# Patient Record
Sex: MALE | Race: OTHER | URBAN - METROPOLITAN AREA
[De-identification: names, ages, dates, MRNs, and addresses within clinical notes are randomized per-mention and may not be internally consistent; named-entity substitution may affect disease eponyms.]

---

## 2019-05-16 ENCOUNTER — APPOINTMENT (OUTPATIENT)
Dept: GENERAL RADIOLOGY | Age: 24
End: 2019-05-16

## 2019-05-16 ENCOUNTER — HOSPITAL ENCOUNTER (EMERGENCY)
Age: 24
Discharge: HOME | End: 2019-05-17
Attending: EMERGENCY MEDICINE

## 2019-05-16 ENCOUNTER — APPOINTMENT (OUTPATIENT)
Dept: CT IMAGING | Age: 24
End: 2019-05-16

## 2019-05-16 DIAGNOSIS — R55 SYNCOPE, UNSPECIFIED SYNCOPE TYPE: Primary | ICD-10-CM

## 2019-05-16 DIAGNOSIS — S01.511A LIP LACERATION, INITIAL ENCOUNTER: ICD-10-CM

## 2019-05-16 LAB
A/G RATIO: 1.5 (ref 1.1–2.2)
ALBUMIN SERPL-MCNC: 4.8 G/DL (ref 3.4–5)
ALP BLD-CCNC: 50 U/L (ref 40–129)
ALT SERPL-CCNC: 15 U/L (ref 10–40)
ANION GAP SERPL CALCULATED.3IONS-SCNC: 13 MMOL/L (ref 3–16)
AST SERPL-CCNC: 22 U/L (ref 15–37)
BASOPHILS ABSOLUTE: 0 K/UL (ref 0–0.2)
BASOPHILS RELATIVE PERCENT: 0.3 %
BILIRUB SERPL-MCNC: 0.4 MG/DL (ref 0–1)
BUN BLDV-MCNC: 10 MG/DL (ref 7–20)
CALCIUM SERPL-MCNC: 9.9 MG/DL (ref 8.3–10.6)
CHLORIDE BLD-SCNC: 101 MMOL/L (ref 99–110)
CO2: 25 MMOL/L (ref 21–32)
CREAT SERPL-MCNC: 1 MG/DL (ref 0.9–1.3)
EOSINOPHILS ABSOLUTE: 0 K/UL (ref 0–0.6)
EOSINOPHILS RELATIVE PERCENT: 0.3 %
GFR AFRICAN AMERICAN: >60
GFR NON-AFRICAN AMERICAN: >60
GLOBULIN: 3.3 G/DL
GLUCOSE BLD-MCNC: 83 MG/DL (ref 70–99)
HCT VFR BLD CALC: 41 % (ref 40.5–52.5)
HEMOGLOBIN: 13.9 G/DL (ref 13.5–17.5)
LYMPHOCYTES ABSOLUTE: 1.4 K/UL (ref 1–5.1)
LYMPHOCYTES RELATIVE PERCENT: 19.9 %
MCH RBC QN AUTO: 31.4 PG (ref 26–34)
MCHC RBC AUTO-ENTMCNC: 33.8 G/DL (ref 31–36)
MCV RBC AUTO: 92.7 FL (ref 80–100)
MONOCYTES ABSOLUTE: 0.4 K/UL (ref 0–1.3)
MONOCYTES RELATIVE PERCENT: 6.2 %
NEUTROPHILS ABSOLUTE: 5.1 K/UL (ref 1.7–7.7)
NEUTROPHILS RELATIVE PERCENT: 73.3 %
PDW BLD-RTO: 14.4 % (ref 12.4–15.4)
PLATELET # BLD: 277 K/UL (ref 135–450)
PMV BLD AUTO: 8.2 FL (ref 5–10.5)
POTASSIUM REFLEX MAGNESIUM: 3.9 MMOL/L (ref 3.5–5.1)
RBC # BLD: 4.42 M/UL (ref 4.2–5.9)
SODIUM BLD-SCNC: 139 MMOL/L (ref 136–145)
TOTAL PROTEIN: 8.1 G/DL (ref 6.4–8.2)
WBC # BLD: 6.9 K/UL (ref 4–11)

## 2019-05-16 PROCEDURE — 70450 CT HEAD/BRAIN W/O DYE: CPT

## 2019-05-16 PROCEDURE — 36415 COLL VENOUS BLD VENIPUNCTURE: CPT

## 2019-05-16 PROCEDURE — 85025 COMPLETE CBC W/AUTO DIFF WBC: CPT

## 2019-05-16 PROCEDURE — 99285 EMERGENCY DEPT VISIT HI MDM: CPT

## 2019-05-16 PROCEDURE — 4500000025 HC ED LEVEL 5 PROCEDURE

## 2019-05-16 PROCEDURE — 93005 ELECTROCARDIOGRAM TRACING: CPT | Performed by: PHYSICIAN ASSISTANT

## 2019-05-16 PROCEDURE — 71046 X-RAY EXAM CHEST 2 VIEWS: CPT

## 2019-05-16 PROCEDURE — 80053 COMPREHEN METABOLIC PANEL: CPT

## 2019-05-16 RX ORDER — PENICILLIN V POTASSIUM 500 MG/1
500 TABLET ORAL 4 TIMES DAILY
Qty: 20 TABLET | Refills: 0 | Status: SHIPPED | OUTPATIENT
Start: 2019-05-16 | End: 2019-05-21

## 2019-05-16 ASSESSMENT — PAIN - FUNCTIONAL ASSESSMENT: PAIN_FUNCTIONAL_ASSESSMENT: 0-10

## 2019-05-16 ASSESSMENT — PAIN SCALES - GENERAL
PAINLEVEL_OUTOF10: 7
PAINLEVEL_OUTOF10: 6
PAINLEVEL_OUTOF10: 8
PAINLEVEL_OUTOF10: 7

## 2019-05-16 ASSESSMENT — PAIN DESCRIPTION - ORIENTATION
ORIENTATION: LEFT
ORIENTATION: LEFT
ORIENTATION: RIGHT

## 2019-05-16 ASSESSMENT — PAIN DESCRIPTION - LOCATION
LOCATION: KNEE
LOCATION: KNEE
LOCATION: FOOT

## 2019-05-16 ASSESSMENT — PAIN DESCRIPTION - PAIN TYPE
TYPE: ACUTE PAIN

## 2019-05-16 ASSESSMENT — PAIN DESCRIPTION - FREQUENCY: FREQUENCY: INTERMITTENT

## 2019-05-16 ASSESSMENT — ENCOUNTER SYMPTOMS
RHINORRHEA: 0
VOMITING: 0
CHEST TIGHTNESS: 0
FACIAL SWELLING: 1
COLOR CHANGE: 0
SHORTNESS OF BREATH: 0
ABDOMINAL PAIN: 0
NAUSEA: 0

## 2019-05-16 ASSESSMENT — PAIN DESCRIPTION - DESCRIPTORS
DESCRIPTORS: ACHING
DESCRIPTORS: ACHING
DESCRIPTORS: ACHING;BURNING
DESCRIPTORS: BURNING

## 2019-05-16 ASSESSMENT — ACTIVITIES OF DAILY LIVING (ADL): EFFECT OF PAIN ON DAILY ACTIVITIES: HURTS TO WALK

## 2019-05-17 VITALS
OXYGEN SATURATION: 98 % | TEMPERATURE: 98.1 F | SYSTOLIC BLOOD PRESSURE: 108 MMHG | HEART RATE: 68 BPM | WEIGHT: 152.12 LBS | DIASTOLIC BLOOD PRESSURE: 56 MMHG

## 2019-05-17 LAB
EKG ATRIAL RATE: 73 BPM
EKG DIAGNOSIS: NORMAL
EKG P AXIS: 88 DEGREES
EKG P-R INTERVAL: 164 MS
EKG Q-T INTERVAL: 392 MS
EKG QRS DURATION: 92 MS
EKG QTC CALCULATION (BAZETT): 431 MS
EKG R AXIS: 71 DEGREES
EKG T AXIS: 72 DEGREES
EKG VENTRICULAR RATE: 73 BPM

## 2019-05-17 PROCEDURE — 90471 IMMUNIZATION ADMIN: CPT | Performed by: PHYSICIAN ASSISTANT

## 2019-05-17 PROCEDURE — 6360000002 HC RX W HCPCS: Performed by: PHYSICIAN ASSISTANT

## 2019-05-17 PROCEDURE — 90715 TDAP VACCINE 7 YRS/> IM: CPT | Performed by: PHYSICIAN ASSISTANT

## 2019-05-17 PROCEDURE — 93010 ELECTROCARDIOGRAM REPORT: CPT | Performed by: INTERNAL MEDICINE

## 2019-05-17 RX ADMIN — TETANUS TOXOID, REDUCED DIPHTHERIA TOXOID AND ACELLULAR PERTUSSIS VACCINE, ADSORBED 0.5 ML: 5; 2.5; 8; 8; 2.5 SUSPENSION INTRAMUSCULAR at 00:35

## 2019-05-17 NOTE — ED NOTES
Ambulates to room #7 accompanied by his mother  History is obtained by the pt who states [place], and felt light [place]-states he has had this sensation in the past and knew that he should sit down before he \"passed out\"-states he got to the car and thought that he should tell his friend so he started back, which is when he passed out-hitting his lower left side of lip and abrasions to both knees.   Awake alert and oriented X 3 NITHYA  States he has passed out in the past and had work up including a and echo cardiogram.  Skin w/d color good resp are easy complains of being cold, warmed blankets applied  Mid level at bedside       Terrie Guevara RN  05/16/19 2398

## 2019-05-17 NOTE — ED PROVIDER NOTES
[address] [facility] ENCOUNTER        Pt Name: Shandra Dennis  MRN: [id number]  [place] 1995  Date of evaluation: 5/16/2019  Provider: Tia Everett PA-C  PCP: No primary care provider on file. This patient was seen and evaluated by the attending physician Dr. Gisela Martinez       Chief Complaint   Patient presents with    Lip Laceration     states that he passed out and \"busted his lip\"  states has passed out before and recognized the signs       HISTORY OF PRESENT ILLNESS   (Location/Symptom, Timing/Onset, Context/Setting, Quality, Duration, Modifying Factors, Severity)  Note limiting factors. Shandra Dennis is a 22 y.o. male presents to emergency Department by private vehicle following the syncopal episode. Patient was at the [employer] store when he began to feel lightheaded. States he's had syncopal episodes previously, last episode about 2 years ago. Has been worked up previously for syncope with no definitive diagnosis. Once he began feeling light headed he went to go sit in his car. He sat down once he arrived to his vehicle, instead of remaining seated he stood up and stepped out of his vehicle. At that time he lost consciousness. He is unsure how long she was unconscious for. He did sustain abrasions to his knees bilaterally, left cheek and lower lip. Has pain rated 6/10. He denies any visual disturbance, nausea, vomiting. Denies any chest pain, shortness of breath, extremity injury. Apart from feeling lightheaded at time of episode, no other complaints. No other medical problems. Nursing Notes were all reviewed and agreed with or any disagreements were addressed  in the HPI. REVIEW OF SYSTEMS    (2-9 systems for level 4, 10 or more for level 5)     Review of Systems   Constitutional: Negative for chills and fever. HENT: Positive for facial swelling. Negative for congestion and rhinorrhea.     Respiratory: Negative for chest tightness and shortness of breath. Cardiovascular: Negative. Gastrointestinal: Negative for abdominal pain, nausea and vomiting. Genitourinary: Negative. Musculoskeletal: Negative for arthralgias and myalgias. Skin: Positive for wound. Negative for color change and pallor. Neurological: Positive for syncope. Negative for dizziness and light-headedness. Psychiatric/Behavioral: Negative for behavioral problems and confusion. Positives and Pertinent negatives as per HPI. Except as noted abovein the ROS, all other systems were reviewed and negative. PAST MEDICAL HISTORY   History reviewed. No pertinent past medical history. SURGICAL HISTORY   History reviewed. No pertinent surgical history. [address]       Discharge Medication List as of 5/17/2019 12:12 AM            ALLERGIES     Patient has no known allergies. FAMILYHISTORY     History reviewed. No pertinent family history.        SOCIAL HISTORY       Social History     Socioeconomic History    Marital status: Single     Spouse name: None    Number of children: None    Years of education: None    Highest education level: None   Occupational History    None   Social Needs    Financial resource strain: None    Food insecurity:     Worry: None     Inability: None    Transportation needs:     Medical: None     Non-medical: None   Tobacco Use    Smoking status: Never Smoker    Smokeless tobacco: Never Used   Substance and Sexual Activity    Alcohol use: Yes     Comment: rarely    Drug use: Never    Sexual activity: Not Currently   Lifestyle    Physical activity:     Days per week: None     Minutes per session: None    Stress: None   Relationships    Social connections:     Talks on phone: None     Gets together: None     Attends Gnosticism service: None     Active member of club or organization: None     Attends meetings of clubs or organizations: None     Relationship status: None    Intimate partner violence:     Fear of current or ex partner: None     Emotionally abused: None     Physically abused: None     Forced sexual activity: None   Other Topics Concern    None   Social History Narrative    None       SCREENINGS             PHYSICAL EXAM    (up to 7 for level 4, 8 or more for level 5)     ED Triage Vitals   BP Temp Temp src Pulse Resp SpO2 Height Weight   -- -- -- -- -- -- -- --       Physical Exam   Constitutional: He is oriented to person, place, and time. He appears well-developed and well-nourished. HENT:   Head: Head is without raccoon's eyes and without Sun's sign. Right Ear: No hemotympanum. Left Ear: No hemotympanum. Through and through lip laceration 1.5 cm externally, 1 cm internally   Eyes: Conjunctivae and EOM are normal.   Neck: Normal range of motion. Neck supple. Cardiovascular: Normal rate and regular rhythm. No murmur heard. Pulmonary/Chest: Effort normal and breath sounds normal. No stridor. No respiratory distress. He has no wheezes. Neurological: He is alert and oriented to person, place, and time. No cranial nerve deficit. Coordination (normal finger to nose, negative protonator drift, no ataxia) normal.   Skin: Skin is warm. He is not diaphoretic. No erythema. For facial abrasions to knees bilaterally, not grossly contaminated, bleeding controlled upon arrival   Psychiatric: He has a normal mood and affect. His behavior is normal.   Vitals reviewed.       DIAGNOSTIC RESULTS   LABS:    Labs Reviewed   CBC WITH AUTO DIFFERENTIAL    Narrative:     Performed at:  [facility]) [facility]  [address] [place], [facility]   Phone [phone]   COMPREHENSIVE METABOLIC PANEL W/ REFLEX TO MG FOR LOW K    Narrative:     Performed at:  [facility]  [address] [place], [facility]   Phone [phone]       All other labs were within normal range or not returned as of this dictation. EKG: All EKG's are interpreted by the Emergency Department Physician who either signs orCo-signs this chart in the absence of a cardiologist.  Please see their note for interpretation of EKG. RADIOLOGY:   Non-plain film images such as CT, Ultrasound and MRI are read by the radiologist. Plain radiographic images are visualized andpreliminarily interpreted by the  ED Provider with the below findings:        Interpretation perthe Radiologist below, if available at the time of this note:    CT HEAD WO CONTRAST   Final Result   No acute intracranial abnormality. XR CHEST STANDARD (2 VW)   Final Result   These. No results found. PROCEDURES   Unless otherwise noted below, none     Lac Repair  Date/Time: 5/16/2019 10:15 PM  Performed by: Santi Monsivais PA-C  Authorized by: Santi Monsivais PA-C     Consent:     Consent obtained:  Verbal    Consent given by:  Patient    Risks discussed:  Infection, pain, poor cosmetic result and poor wound healing    Alternatives discussed:  No treatment  Anesthesia (see MAR for exact dosages): Anesthesia method:  Local infiltration    Local anesthetic:  Lidocaine 1% w/o epi  Laceration details:     Location:  Lip    Lip location:  Lower lip, full thickness    Vermilion border involved: yes      Height of lip laceration:  Vermillion only    Length (cm):  2  Repair type:     Repair type:   Intermediate  Exploration:     Hemostasis achieved with:  Direct pressure  Treatment:     Area cleansed with:  Cecil    Amount of cleaning:  Standard    Irrigation solution:  Sterile saline    Irrigation method:  Pressure wash  Mucous membrane repair:     Suture size:  5-0    Suture material:  Fast-absorbing gut    Suture technique:  Simple interrupted    Number of sutures:  2  Skin repair:     Repair method:  Sutures    Suture size:  5-0    Suture material:  Prolene    Suture technique:  Simple interrupted    Number of sutures:  3  Approximation: Approximation:  Close    Vermilion border: well-aligned    Post-procedure details:     Dressing:  Open (no dressing)    Patient tolerance of procedure: Tolerated well, no immediate complications        CRITICAL CARE TIME   N/A    CONSULTS:  None      EMERGENCY DEPARTMENT COURSE and DIFFERENTIALDIAGNOSIS/MDM:   Vitals:    Vitals:    05/16/19 2140 05/16/19 2240   BP: 110/78 (!) 108/56   Pulse: 73 68   Temp: 98.1 °F (36.7 °C)    TempSrc: Oral    SpO2: 98% 98%   Weight: 152 lb 1.9 oz (69 kg)        Patient was given thefollowing medications:  Medications   Tetanus-Diphth-Acell Pertussis (BOOSTRIX) injection 0.5 mL (0.5 mLs Intramuscular Given 5/17/19 0035)       Patient presents to ED with HPI noted above. He is hemodynamically stable, afebrile and nontoxic-appearing. He is not hypoxic with oxygen saturation of 98% on room air. Given syncopal episode basic labs obtained as well as EKG and chest x-ray. Patient did have a head injury, no neurological exam.  Patient unsure how long he was unconscious, a CT of head without contrast obtained. Pending imaging and lab results my shift ended. Please see Dr. Nida Carpenter note regarding final disposition. Prior to my departure laceration of lower lip was repaired as noted above by myself. Patient will need antibiotics. Tetanus updated in the ED. The patient tolerated their visit well. They were seen and evaluated by the attending physician, Dr. Jerod Perkins who agreed with the assessment and plan. The patient and / or the family were informed of the results of any tests, a time was given to answer questions, a plan was proposed and they agreed with plan. FINAL IMPRESSION      1. Syncope, unspecified syncope type    2.  Lip laceration, initial encounter          DISPOSITION/PLAN   DISPOSITION        PATIENT REFERREDTO:  [facility]) Referral  Call [phone] for an appointment  Call today        DISCHARGE MEDICATIONS:  Discharge Medication List as of 5/17/2019 12:12

## 2019-05-17 NOTE — ED PROVIDER NOTES
Attending Note:    The patient was seen and examined by the mid-level provider. I also completed a face-to-face examination. HPI:  The patient presented with a lip laceration that occurred when he passed out prior to arrival. He reports that he was at a store, talking with a , and suddenly felt lightheaded feeling like he was going to pass out. He described this as a familiar feeling given the fact that it has happened 2 or 3 times in the past. His last episode of syncope was in 2017 and he underwent workup at that time including evaluation by cardiologist and 2-D echocardiogram which she reports were normal.    Today he reports he started to feel lightheaded, became clammy, and then his vision became fuzzy. He tried to walk to his car but passed out and fell striking his head on the ground. He had a brief loss of consciousness. He denies any seizure activity. He did not bite his tongue. No incontinence. No history of seizures. He denies any recent illness, fever, chills, nausea vomiting or diarrhea. He denies any associated chest pain heaviness pressure or tightness. He denies any shortness of breath. He denies any recent travel history. No leg or calf pain. He denies any history of thromboembolic disease. He denies any prior family or personal history of arrhythmia. No family history of syncope. Physical Exam: Awake and alert and oriented ×3. GCS 15. Speech. Clear. Gait steady. Cranial nerves II through XII are intact. No facial droop. No acute focal motor or sensory deficits. Heart regular rate and rhythm without murmur. Lungs clear to auscultation. Abdomen soft and nontender. He does have laceration to the lower lip that extends onto the interim mucosa and an abrasion to the chin. Wound has been repaired by the physician assistant prior to my examination. Neck is nontender. No point or axial tenderness. Chest wall nontender. Extremities nontender with full range of motion.  No calf tenderness erythema or edema. EKG: Normal sinus rhythm. Rate 73. NM interval 164 ms. QRS duration 92 ms.  ms. R axis 71°. No acute ST elevation. He did have diffuse J-point elevation with notching consistent with early repolarization. Medical Decision-Making:  The patient presented with an episode of syncope as noted above. He is currently asymptomatic. He denies any current chest pain or shortness of breath. I suspect that his syncope was vasovagal in origin. I recommended that he follow-up with his primary care physician. Given the fact that he has had 2 or 3 episodes I recommended further evaluation with 24 hour Holter monitor was advised to contact his primary care physician to schedule this to be done. I advised to not drive or operate machinery feeling dizzy or lightheaded. If his condition worsens or new symptoms develop, he was advised to return immediately to the emergency department. He will be given a prescription for Pen-Vee K for his oral laceration. I recommended suture removal in 5-7 days with his primary care physician. I am in agreement with the physician assistants treatment plan and disposition.      Shoaib Orourke,   05/17/19 7364

## 2019-05-17 NOTE — ED NOTES
Abrasions cleansed and PSO and DSD applied-tolerated well  Mother remains at bedside.   Instructions reviewed with pt and mother     Charly Abdi RN  05/17/19 001

## 2022-12-22 ENCOUNTER — OFFICE VISIT (OUTPATIENT)
Dept: URGENT CARE | Age: 27
End: 2022-12-22

## 2022-12-22 VITALS
RESPIRATION RATE: 16 BRPM | DIASTOLIC BLOOD PRESSURE: 70 MMHG | TEMPERATURE: 98.7 F | WEIGHT: 161 LBS | OXYGEN SATURATION: 95 % | HEIGHT: 76 IN | SYSTOLIC BLOOD PRESSURE: 104 MMHG | BODY MASS INDEX: 19.61 KG/M2 | HEART RATE: 90 BPM

## 2022-12-22 DIAGNOSIS — Z20.2 SEXUALLY TRANSMITTED DISEASE EXPOSURE: Primary | ICD-10-CM

## 2022-12-22 ASSESSMENT — ENCOUNTER SYMPTOMS
SHORTNESS OF BREATH: 0
COUGH: 0
SORE THROAT: 0

## 2022-12-22 NOTE — LETTER
[facility]  [address] [address]  Phone: [phone]  Fax: [phone]    ADAN Crandall CNP        December 22, 2022     Patient: Nancy Warner   YOB: 1995   Date of Visit: 12/22/2022       To Whom it May Concern:    Nancy Warner was seen in my clinic on 12/22/2022. He may return to work on 12/22/2022. If you have any questions or concerns, please don't hesitate to call.     Sincerely,         ADAN Crandall - JOSE   By: Dusty Alicea CMA

## 2022-12-22 NOTE — PROGRESS NOTES
Pia Howard (:  1995) is a 33 y.o. male,New patient, here for evaluation of the following chief complaint(s):  Sexually Transmitted Diseases (Would like screened for all STIs, concerned about HPV and Chlamydia, was positive last month and did treatment, needs a test of cure ) and Mass (Penile area )      ASSESSMENT/PLAN:    ICD-10-CM    1. Sexually transmitted disease exposure  Z20.2 [facility] - Fernando Payne MD, Urology, Evelyn Mccord     C.trachomatis N.gonorrhoeae DNA        Urology referral for possible HPV. Urine sent for chlamydia for test of cure. Safe sex recommended. Follow up with Urgent Care if testing is abnormal.    SUBJECTIVE/OBJECTIVE:    History provided by:  Patient   used: No    Sexually Transmitted Diseases  The patient's primary symptoms include genital lesions (white bump to penis). The patient's pertinent negatives include no genital injury, genital itching, pelvic pain, penile discharge, penile pain, scrotal swelling or testicular pain. This is a new problem. The current episode started 1 to 4 weeks ago. The problem has been gradually worsening. Pertinent negatives include no chills, coughing, joint pain, painful intercourse, rash, shortness of breath or sore throat. He has tried nothing for the symptoms. He is sexually active with multiple partners. He consistently uses condoms. No, his partner does not have an STD. His past medical history is significant for chlamydia. Mass  Associated symptoms: no cough, no rash, no shortness of breath and no sore throat    HPI:   33 y.o. male presents with symptom of \"bump on penis\" ongoing for one month (see above). Denies pain, drainage, or discharge. Has not applied anything topically for symptoms. Per Care Everywhere, he was seen at [address] [place] [address] in 2022. Tested positive for chlamydia and completed a course of doxycycline. Tested negative for gonorrhea, syphilis, Hep A, B, & C, HSV, and HIV.     Vitals:    22 1318 BP: 104/70   Site: Left Upper Arm   Position: Sitting   Cuff Size: Medium Adult   Pulse: 90   Resp: 16   Temp: 98.7 °F (37.1 °C)   SpO2: 95%   Weight: 161 lb (73 kg)   Height: 6' 4\" (1.93 m)       Review of Systems   Constitutional:  Negative for chills and diaphoresis. HENT:  Negative for sore throat. Respiratory:  Negative for cough and shortness of breath. Genitourinary:  Negative for pelvic pain, penile discharge, penile pain, scrotal swelling and testicular pain. Musculoskeletal:  Negative for joint pain. Skin:  Negative for rash. Physical Exam  Exam conducted with a chaperone present (jose kay). Constitutional:       Appearance: Normal appearance. HENT:      Head: Normocephalic and atraumatic. Mouth/Throat:      Mouth: Mucous membranes are moist.      Pharynx: Oropharynx is clear. Cardiovascular:      Rate and Rhythm: Normal rate and regular rhythm. Pulses: Normal pulses. Heart sounds: Normal heart sounds. Pulmonary:      Effort: Pulmonary effort is normal.      Breath sounds: Normal breath sounds. Genitourinary:     Penis: Lesions (small dark bump to base of left side of penis; no other lesions or open areas) present. No erythema, tenderness, discharge or swelling. Testes: Normal.       Musculoskeletal:      Cervical back: Normal range of motion and neck supple. Neurological:      Mental Status: He is alert. An electronic signature was used to authenticate this note.     --ADAN Pacheco - CNP

## 2022-12-22 NOTE — PATIENT INSTRUCTIONS
Urology referral  Urine sent for chlamydia for recheck after treatment.   Follow up with Urgent Care if testing is abnormal.

## 2022-12-23 LAB
C. TRACHOMATIS DNA ,URINE: NEGATIVE
N. GONORRHOEAE DNA, URINE: NEGATIVE

## 2023-10-01 ENCOUNTER — OFFICE VISIT (OUTPATIENT)
Dept: URGENT CARE | Age: 28
End: 2023-10-01

## 2023-10-01 VITALS
DIASTOLIC BLOOD PRESSURE: 67 MMHG | TEMPERATURE: 98.2 F | SYSTOLIC BLOOD PRESSURE: 93 MMHG | HEART RATE: 96 BPM | HEIGHT: 77 IN | OXYGEN SATURATION: 96 % | BODY MASS INDEX: 19.48 KG/M2 | WEIGHT: 165 LBS

## 2023-10-01 DIAGNOSIS — R30.0 DYSURIA: ICD-10-CM

## 2023-10-01 DIAGNOSIS — Z11.3 SCREENING FOR STD (SEXUALLY TRANSMITTED DISEASE): Primary | ICD-10-CM

## 2023-10-01 PROBLEM — A74.9 CHLAMYDIA: Status: ACTIVE | Noted: 2022-11-11

## 2023-10-01 LAB
BILIRUBIN, POC: ABNORMAL
BLOOD URINE, POC: ABNORMAL
CLARITY, POC: CLEAR
COLOR, POC: ABNORMAL
GLUCOSE URINE, POC: ABNORMAL
KETONES, POC: ABNORMAL
LEUKOCYTE EST, POC: ABNORMAL
NITRITE, POC: ABNORMAL
PH, POC: 5.5
PROTEIN, POC: ABNORMAL
SPECIFIC GRAVITY, POC: 1.02
SPECIMEN TYPE: NORMAL
TRICHOMONAS VAGINALIS SCREEN: NEGATIVE
UROBILINOGEN, POC: 0.2

## 2023-10-02 LAB
BACTERIA UR CULT: NORMAL
HIV 1+2 AB+HIV1 P24 AG SERPL QL IA: NORMAL
HIV 2 AB SERPL QL IA: NORMAL
HIV1 AB SERPL QL IA: NORMAL
HIV1 P24 AG SERPL QL IA: NORMAL
REAGIN+T PALLIDUM IGG+IGM SERPL-IMP: NORMAL

## 2023-10-03 ENCOUNTER — TELEPHONE (OUTPATIENT)
Dept: URGENT CARE | Age: 28
End: 2023-10-03

## 2023-10-03 LAB
C TRACH DNA UR QL NAA+PROBE: NEGATIVE
N GONORRHOEA DNA UR QL NAA+PROBE: NEGATIVE

## 2023-10-03 NOTE — TELEPHONE ENCOUNTER
Attempted to call patient left vm to call back. Please let him know his Gonorrhea, Chlamydia, trichomas, HIV, syphilis and urine culture were all negative. Still waiting on HSV to result.  If still having sxs he can follow up at urgent care or PCP

## 2023-10-06 ENCOUNTER — TELEPHONE (OUTPATIENT)
Dept: URGENT CARE | Age: 28
End: 2023-10-06

## 2023-10-06 LAB
HSV1 GG IGG SER-ACNC: 0.45 IV
HSV1+2 IGG SER IA-ACNC: 2.81 IV
HSV1+2 IGM SER IA-ACNC: 0.36 IV
HSV2 GG IGG SER-ACNC: 0.56 IV

## 2023-10-06 NOTE — TELEPHONE ENCOUNTER
Patient called and notified of negative STD testing. HSV combined was positive for herpes and additional testing is recommended to confirm. Patient verbalized understanding. Advised to follow up with PCP or UC.

## 2023-10-18 DIAGNOSIS — Z11.3 SCREENING FOR STD (SEXUALLY TRANSMITTED DISEASE): Primary | ICD-10-CM

## 2023-11-28 DIAGNOSIS — Z11.3 SCREENING FOR STD (SEXUALLY TRANSMITTED DISEASE): ICD-10-CM

## 2023-12-01 LAB
HSV1 GG IGG SER-ACNC: 0.23 IV
HSV1+2 IGG SER IA-ACNC: 2.61 IV
HSV1+2 IGM SER IA-ACNC: 0.42 IV
HSV2 GG IGG SER-ACNC: 0.09 IV

## 2023-12-06 NOTE — RESULT ENCOUNTER NOTE
Called and discussed HSV results.   The combined was positive but individually HSV1 and HSV2 were negative.     I discussed follow up with a PCP or establishing one local to follow up and to do further investigation.     Patient agreed and will follow up.

## 2025-07-08 ENCOUNTER — OFFICE VISIT (OUTPATIENT)
Dept: URGENT CARE | Age: 30
End: 2025-07-08

## 2025-07-08 ENCOUNTER — APPOINTMENT (OUTPATIENT)
Age: 30
End: 2025-07-08
Payer: COMMERCIAL

## 2025-07-08 ENCOUNTER — HOSPITAL ENCOUNTER (EMERGENCY)
Age: 30
Discharge: HOME | End: 2025-07-08
Attending: EMERGENCY MEDICINE
Payer: COMMERCIAL

## 2025-07-08 VITALS
RESPIRATION RATE: 18 BRPM | OXYGEN SATURATION: 93 % | SYSTOLIC BLOOD PRESSURE: 117 MMHG | BODY MASS INDEX: 19.48 KG/M2 | WEIGHT: 165 LBS | DIASTOLIC BLOOD PRESSURE: 75 MMHG | HEART RATE: 63 BPM | TEMPERATURE: 97.7 F | HEIGHT: 77 IN

## 2025-07-08 VITALS
TEMPERATURE: 98.6 F | DIASTOLIC BLOOD PRESSURE: 62 MMHG | HEART RATE: 79 BPM | SYSTOLIC BLOOD PRESSURE: 104 MMHG | OXYGEN SATURATION: 98 %

## 2025-07-08 DIAGNOSIS — S09.90XA CLOSED HEAD INJURY, INITIAL ENCOUNTER: Primary | ICD-10-CM

## 2025-07-08 DIAGNOSIS — S09.90XA INJURY OF HEAD, INITIAL ENCOUNTER: Primary | ICD-10-CM

## 2025-07-08 PROCEDURE — 70450 CT HEAD/BRAIN W/O DYE: CPT

## 2025-07-08 PROCEDURE — 99284 EMERGENCY DEPT VISIT MOD MDM: CPT

## 2025-07-08 PROCEDURE — 6370000000 HC RX 637 (ALT 250 FOR IP): Performed by: EMERGENCY MEDICINE

## 2025-07-08 RX ORDER — ONDANSETRON 4 MG/1
4 TABLET, ORALLY DISINTEGRATING ORAL ONCE
Status: COMPLETED | OUTPATIENT
Start: 2025-07-08 | End: 2025-07-08

## 2025-07-08 RX ORDER — METOCLOPRAMIDE HYDROCHLORIDE 5 MG/ML
10 INJECTION INTRAMUSCULAR; INTRAVENOUS ONCE
Status: DISCONTINUED | OUTPATIENT
Start: 2025-07-08 | End: 2025-07-08

## 2025-07-08 RX ORDER — NAPROXEN 500 MG/1
500 TABLET ORAL 2 TIMES DAILY WITH MEALS
Qty: 14 TABLET | Refills: 0 | Status: SHIPPED | OUTPATIENT
Start: 2025-07-08 | End: 2025-07-15

## 2025-07-08 RX ORDER — ACETAMINOPHEN 500 MG
1000 TABLET ORAL
Status: COMPLETED | OUTPATIENT
Start: 2025-07-08 | End: 2025-07-08

## 2025-07-08 RX ORDER — METOCLOPRAMIDE 10 MG/1
10 TABLET ORAL
Qty: 120 TABLET | Refills: 3 | Status: SHIPPED | OUTPATIENT
Start: 2025-07-08

## 2025-07-08 RX ORDER — METOCLOPRAMIDE 5 MG/1
10 TABLET ORAL ONCE
Status: COMPLETED | OUTPATIENT
Start: 2025-07-08 | End: 2025-07-08

## 2025-07-08 RX ORDER — IBUPROFEN 600 MG/1
600 TABLET, FILM COATED ORAL
Status: COMPLETED | OUTPATIENT
Start: 2025-07-08 | End: 2025-07-08

## 2025-07-08 RX ORDER — 0.9 % SODIUM CHLORIDE 0.9 %
1000 INTRAVENOUS SOLUTION INTRAVENOUS ONCE
Status: DISCONTINUED | OUTPATIENT
Start: 2025-07-08 | End: 2025-07-08

## 2025-07-08 RX ADMIN — ONDANSETRON 4 MG: 4 TABLET, ORALLY DISINTEGRATING ORAL at 19:41

## 2025-07-08 RX ADMIN — IBUPROFEN 600 MG: 600 TABLET ORAL at 20:59

## 2025-07-08 RX ADMIN — METOCLOPRAMIDE 10 MG: 5 TABLET ORAL at 20:59

## 2025-07-08 RX ADMIN — ACETAMINOPHEN 1000 MG: 500 TABLET ORAL at 19:40

## 2025-07-08 ASSESSMENT — PAIN DESCRIPTION - LOCATION
LOCATION: HEAD
LOCATION: HEAD

## 2025-07-08 ASSESSMENT — LIFESTYLE VARIABLES
HOW OFTEN DO YOU HAVE A DRINK CONTAINING ALCOHOL: MONTHLY OR LESS
HOW MANY STANDARD DRINKS CONTAINING ALCOHOL DO YOU HAVE ON A TYPICAL DAY: 1 OR 2

## 2025-07-08 ASSESSMENT — PAIN SCALES - GENERAL
PAINLEVEL_OUTOF10: 5
PAINLEVEL_OUTOF10: 5
PAINLEVEL_OUTOF10: 3

## 2025-07-08 ASSESSMENT — PAIN DESCRIPTION - DESCRIPTORS: DESCRIPTORS: ACHING

## 2025-07-08 ASSESSMENT — PAIN - FUNCTIONAL ASSESSMENT: PAIN_FUNCTIONAL_ASSESSMENT: 0-10

## 2025-07-08 NOTE — PATIENT INSTRUCTIONS
Pt presents with persistent headaches since head injury 7 days prior. Pt states \"worst headache of his life\" , photophobia, increased fatigue and friends concerned for personality changes.  Pt advised to see ER for further evaluation.

## 2025-07-08 NOTE — PROGRESS NOTES
TRI[facility] NOTE TO THE EMERGENCY DEPARTMENT    CHIEF COMPLAINT    Chief Complaint   Patient presents with    Headache     States that he hit his head Tues or Wednesday he dose not remember , hit temple on sharp corner of TV stand , constant headaches since     Photophobia    Head Injury    Fatigue         MEDICAL DECISION MAKING    Patient presented with   Chief Complaint   Patient presents with    Headache     States that he hit his head Tues or Wednesday he dose not remember , hit temple on sharp corner of TV stand , constant headaches since     Photophobia    Head Injury    Fatigue    .  Discussed potential concerns for: persistant headache with  \"worst headache of his life\" , photophobia, increased fatigue and friends concerned for personality changes.     Recommend patient to seek care at the nearest emergency department for further evaluation.      Transportation:  private car - mother driving, paperwork given to patient    TEST / PROCEDURES COMPLETED AT URGENT CARE:  1.No results found for this visit on 07/08/25.    Assessment & Plan   To ER, patient agreeable to further evaluation due to workup unavailable at urgent care    Visit Diagnoses and Associated Orders         Injury of head, initial encounter    -  Primary                  Subjective   HPI    Jose Luis Maldonado is a 30 y.o. male who presents headache after head injury 7-8 days prior with worst headache of his life\" , photophobia, increased fatigue and friends concerned for personality changes.        CURRENT MEDICATIONS        ALLERGIES    No Known Allergies          Objective   PHYSICAL EXAM  Physical Exam  HENT:      Head: Normocephalic.   Eyes:      Pupils: Pupils are equal, round, and reactive to light.   Cardiovascular:      Rate and Rhythm: Normal rate and regular rhythm.   Pulmonary:      Effort: Pulmonary effort is normal.      Breath sounds: Normal breath sounds.   Musculoskeletal:      Cervical back: Normal range of motion.   Skin:     General:

## 2025-07-08 NOTE — ED PROVIDER NOTES
mg Oral Given 7/8/25 2059)        Hospitalization considered?:  No    Additional testing considered?:  None    Sepsis present?:  No    PROCEDURES  None    CLINICAL IMPRESSION  1. Closed head injury, initial encounter        DISPOSITION  Decision To Discharge 07/08/2025 08:47:58 PM   DISPOSITION CONDITION Stable     REFERALS  [facility]  [address] [facility] [place] [address]  [phone]          Discharge Medications:  New Prescriptions    METOCLOPRAMIDE (REGLAN) 10 MG TABLET    Take 1 tablet by mouth 3 times daily (with meals)    NAPROXEN (NAPROSYN) 500 MG TABLET    Take 1 tablet by mouth 2 times daily (with meals) for 7 days       CRITICAL CARE TIME  None        I have personally seen and examined this patient. I have fully participated in the care of this patient and I have reviewed and agree with all pertinent clinical information including history, physical exam, and plan. I have also reviewed and agree with the medications, allergies and past medical history section for this patient.    Electronically signed by: Adonay Salazar Jr., , JACQUELYN, JAMIA, 7/8/2025  9:11 PM       Adonay Salazar DO  07/08/25 2111